# Patient Record
Sex: FEMALE | URBAN - METROPOLITAN AREA
[De-identification: names, ages, dates, MRNs, and addresses within clinical notes are randomized per-mention and may not be internally consistent; named-entity substitution may affect disease eponyms.]

---

## 2019-05-07 ENCOUNTER — APPOINTMENT (OUTPATIENT)
Age: 69
Setting detail: DERMATOLOGY
End: 2019-05-08

## 2019-05-07 DIAGNOSIS — L91.8 OTHER HYPERTROPHIC DISORDERS OF THE SKIN: ICD-10-CM

## 2019-05-07 DIAGNOSIS — L82.0 INFLAMED SEBORRHEIC KERATOSIS: ICD-10-CM

## 2019-05-07 PROBLEM — D48.5 NEOPLASM OF UNCERTAIN BEHAVIOR OF SKIN: Status: ACTIVE | Noted: 2019-05-07

## 2019-05-07 PROCEDURE — 11301 SHAVE SKIN LESION 0.6-1.0 CM: CPT

## 2019-05-07 PROCEDURE — OTHER SHAVE REMOVAL: OTHER

## 2019-05-07 PROCEDURE — OTHER REASSURANCE: OTHER

## 2019-05-07 PROCEDURE — 11301 SHAVE SKIN LESION 0.6-1.0 CM: CPT | Mod: 76

## 2019-05-07 PROCEDURE — OTHER MIPS QUALITY: OTHER

## 2019-05-07 PROCEDURE — OTHER COUNSELING: OTHER

## 2019-05-07 PROCEDURE — 99203 OFFICE O/P NEW LOW 30 MIN: CPT | Mod: 25

## 2019-05-07 PROCEDURE — OTHER PRESCRIPTION: OTHER

## 2019-05-07 PROCEDURE — OTHER DEFER: OTHER

## 2019-05-07 RX ORDER — LIDOCAINE AND PRILOCAINE 25; 25 MG/G; MG/G
CREAM TOPICAL
Qty: 1 | Refills: 0 | Status: ACTIVE

## 2019-05-07 ASSESSMENT — LOCATION SIMPLE DESCRIPTION DERM
LOCATION SIMPLE: RIGHT ANTERIOR NECK
LOCATION SIMPLE: LEFT ANTERIOR NECK
LOCATION SIMPLE: ABDOMEN
LOCATION SIMPLE: RIGHT AXILLARY VAULT

## 2019-05-07 ASSESSMENT — LOCATION DETAILED DESCRIPTION DERM
LOCATION DETAILED: RIGHT SUPERIOR ANTERIOR NECK
LOCATION DETAILED: LEFT SUPERIOR ANTERIOR NECK
LOCATION DETAILED: LEFT SUPERIOR LATERAL NECK
LOCATION DETAILED: RIGHT AXILLARY VAULT
LOCATION DETAILED: RIGHT SUPERIOR LATERAL NECK
LOCATION DETAILED: RIGHT RIB CAGE

## 2019-05-07 ASSESSMENT — LOCATION ZONE DERM
LOCATION ZONE: TRUNK
LOCATION ZONE: AXILLAE
LOCATION ZONE: NECK

## 2019-05-07 NOTE — PROCEDURE: SHAVE REMOVAL
X Size Of Lesion In Cm (Optional): 0
Add Variable For Additional Medical Justification: No
Wound Care: Petrolatum
Detail Level: Detailed
Post-Care Instructions: I reviewed with the patient in detail post-care instructions. Patient is to keep the biopsy site dry overnight, and then apply bacitracin twice daily until healed. Patient may apply hydrogen peroxide soaks to remove any crusting.
Billing Type: Third-Party Bill
Size Of Lesion In Cm (Required): 0.6
Biopsy Method: Dermablade
Anesthesia Type: 1% lidocaine with epinephrine
Hemostasis: Electrocautery
Was A Bandage Applied: Yes
Notification Instructions: Patient will be notified of biopsy results. However, patient instructed to call the office if not contacted within 2 weeks.
Consent was obtained from the patient. The risks and benefits to therapy were discussed in detail. Specifically, the risks of infection, scarring, bleeding, prolonged wound healing, incomplete removal, allergy to anesthesia, nerve injury and recurrence were addressed. Prior to the procedure, the treatment site was clearly identified and confirmed by the patient. All components of Universal Protocol/PAUSE Rule completed.
Path Notes (To The Dermatopathologist): Please check margins.
Medical Necessity Information: It is in your best interest to select a reason for this procedure from the list below. All of these items fulfill various CMS LCD requirements except the new and changing color options.

## 2019-07-25 ENCOUNTER — APPOINTMENT (OUTPATIENT)
Age: 69
Setting detail: DERMATOLOGY
End: 2019-07-25

## 2019-07-25 DIAGNOSIS — D485 NEOPLASM OF UNCERTAIN BEHAVIOR OF SKIN: ICD-10-CM

## 2019-07-25 DIAGNOSIS — L91.8 OTHER HYPERTROPHIC DISORDERS OF THE SKIN: ICD-10-CM

## 2019-07-25 PROBLEM — D48.5 NEOPLASM OF UNCERTAIN BEHAVIOR OF SKIN: Status: ACTIVE | Noted: 2019-07-25

## 2019-07-25 PROCEDURE — 11103 TANGNTL BX SKIN EA SEP/ADDL: CPT

## 2019-07-25 PROCEDURE — 17110 DESTRUCT B9 LESION 1-14: CPT

## 2019-07-25 PROCEDURE — OTHER COUNSELING: OTHER

## 2019-07-25 PROCEDURE — OTHER BENIGN DESTRUCTION: OTHER

## 2019-07-25 PROCEDURE — 11102 TANGNTL BX SKIN SINGLE LES: CPT | Mod: 59

## 2019-07-25 PROCEDURE — OTHER BIOPSY BY SHAVE METHOD: OTHER

## 2019-07-25 PROCEDURE — OTHER MIPS QUALITY: OTHER

## 2019-07-25 ASSESSMENT — LOCATION ZONE DERM
LOCATION ZONE: TRUNK
LOCATION ZONE: NECK

## 2019-07-25 ASSESSMENT — LOCATION SIMPLE DESCRIPTION DERM
LOCATION SIMPLE: RIGHT ANTERIOR NECK
LOCATION SIMPLE: LEFT CLAVICULAR SKIN
LOCATION SIMPLE: ABDOMEN

## 2019-07-25 ASSESSMENT — LOCATION DETAILED DESCRIPTION DERM
LOCATION DETAILED: RIGHT INFERIOR LATERAL NECK
LOCATION DETAILED: LEFT RIB CAGE
LOCATION DETAILED: LEFT CLAVICULAR SKIN

## 2019-07-25 NOTE — PROCEDURE: BIOPSY BY SHAVE METHOD
Electrodesiccation Text: The wound bed was treated with electrodesiccation after the biopsy was performed.
Was A Bandage Applied: Yes
Bill For Surgical Tray: no
Electrodesiccation And Curettage Text: The wound bed was treated with electrodesiccation and curettage after the biopsy was performed.
Biopsy Type: H and E
Dressing: bandage
Detail Level: Detailed
Type Of Destruction Used: Curettage
Hemostasis: Drysol
Biopsy Method: Dermablade
Curettage Text: The wound bed was treated with curettage after the biopsy was performed.
Wound Care: Petrolatum
Notification Instructions: Patient will be notified of biopsy results. However, patient instructed to call the office if not contacted within 2 weeks.
Size Of Lesion In Cm: 0
Depth Of Biopsy: dermis
Anesthesia Type: 1% lidocaine with epinephrine
Silver Nitrate Text: The wound bed was treated with silver nitrate after the biopsy was performed.
Anesthesia Volume In Cc (Will Not Render If 0): 0.5
Cryotherapy Text: The wound bed was treated with cryotherapy after the biopsy was performed.
Billing Type: Third-Party Bill
Post-Care Instructions: I reviewed with the patient in detail post-care instructions. Patient is to keep the biopsy site dry overnight, and then apply bacitracin twice daily until healed. Patient may apply hydrogen peroxide soaks to remove any crusting.
Consent: Written consent was obtained and risks were reviewed including but not limited to scarring, infection, bleeding, scabbing, incomplete removal, nerve damage and allergy to anesthesia.

## 2019-07-25 NOTE — PROCEDURE: MIPS QUALITY
Quality 130: Documentation Of Current Medications In The Medical Record: Current Medications Documented
Detail Level: Detailed
Quality 431: Preventive Care And Screening: Unhealthy Alcohol Use - Screening: Patient screened for unhealthy alcohol use using a single question and scores less than 2 times per year
Quality 110: Preventive Care And Screening: Influenza Immunization: Influenza Immunization previously received during influenza season
Quality 226: Preventive Care And Screening: Tobacco Use: Screening And Cessation Intervention: Patient screened for tobacco use and is an ex/non-smoker

## 2021-01-14 ENCOUNTER — APPOINTMENT (OUTPATIENT)
Age: 71
Setting detail: DERMATOLOGY
End: 2021-01-15

## 2021-01-14 DIAGNOSIS — L21.8 OTHER SEBORRHEIC DERMATITIS: ICD-10-CM

## 2021-01-14 DIAGNOSIS — L82.0 INFLAMED SEBORRHEIC KERATOSIS: ICD-10-CM

## 2021-01-14 PROCEDURE — OTHER COUNSELING: OTHER

## 2021-01-14 PROCEDURE — OTHER MIPS QUALITY: OTHER

## 2021-01-14 PROCEDURE — OTHER LIQUID NITROGEN: OTHER

## 2021-01-14 PROCEDURE — 17110 DESTRUCT B9 LESION 1-14: CPT

## 2021-01-14 PROCEDURE — OTHER PRESCRIPTION: OTHER

## 2021-01-14 PROCEDURE — 99213 OFFICE O/P EST LOW 20 MIN: CPT | Mod: 25

## 2021-01-14 RX ORDER — TRIAMCINOLONE ACETONIDE 1 MG/G
CREAM TOPICAL
Qty: 1 | Refills: 2 | Status: ERX | COMMUNITY
Start: 2021-01-14

## 2021-01-14 ASSESSMENT — LOCATION SIMPLE DESCRIPTION DERM
LOCATION SIMPLE: LEFT UPPER BACK
LOCATION SIMPLE: RIGHT UPPER BACK

## 2021-01-14 ASSESSMENT — LOCATION DETAILED DESCRIPTION DERM
LOCATION DETAILED: RIGHT SUPERIOR UPPER BACK
LOCATION DETAILED: RIGHT MID-UPPER BACK
LOCATION DETAILED: LEFT MID-UPPER BACK

## 2021-01-14 ASSESSMENT — LOCATION ZONE DERM: LOCATION ZONE: TRUNK

## 2021-01-14 NOTE — PROCEDURE: LIQUID NITROGEN
Render Post-Care Instructions In Note?: no
Include Z78.9 (Other Specified Conditions Influencing Health Status) As An Associated Diagnosis?: Yes
Post-Care Instructions: I reviewed with the patient in detail post-care instructions. Patient is to wear sunprotection, and avoid picking at any of the treated lesions. Pt may apply Vaseline to crusted or scabbing areas.
Medical Necessity Information: It is in your best interest to select a reason for this procedure from the list below. All of these items fulfill various CMS LCD requirements except the new and changing color options.
Medical Necessity Clause: This procedure was medically necessary because the lesions that were treated were:
Duration Of Freeze Thaw-Cycle (Seconds): 5-10
Number Of Freeze-Thaw Cycles: 2 freeze-thaw cycles
Consent: The patient's consent was obtained including but not limited to risks of crusting, scabbing, blistering, scarring, darker or lighter pigmentary change, recurrence, incomplete removal and infection.
Detail Level: Detailed

## 2021-11-16 ENCOUNTER — OFFICE VISIT (OUTPATIENT)
Dept: URBAN - METROPOLITAN AREA TELEHEALTH 2 | Facility: TELEHEALTH | Age: 71
End: 2021-11-16

## 2021-11-17 ENCOUNTER — OFFICE VISIT (OUTPATIENT)
Dept: URBAN - NONMETROPOLITAN AREA CLINIC 4 | Facility: CLINIC | Age: 71
End: 2021-11-17

## 2021-11-30 ENCOUNTER — OFFICE VISIT (OUTPATIENT)
Dept: URBAN - METROPOLITAN AREA TELEHEALTH 2 | Facility: TELEHEALTH | Age: 71
End: 2021-11-30
Payer: MEDICARE

## 2021-11-30 DIAGNOSIS — Z86.010 PERSONAL HISTORY OF COLONIC POLYPS: ICD-10-CM

## 2021-11-30 PROCEDURE — 99202 OFFICE O/P NEW SF 15 MIN: CPT | Performed by: INTERNAL MEDICINE

## 2021-11-30 RX ORDER — ATORVASTATIN CALCIUM 80 MG/1
TAKE 1 TABLET (80 MG) BY ORAL ROUTE ONCE DAILY TABLET, FILM COATED ORAL 1
Qty: 0 | Refills: 0 | Status: ACTIVE | COMMUNITY
Start: 1900-01-01

## 2021-11-30 RX ORDER — OMEGA-3/DHA/EPA/FISH OIL 60 MG-90MG
CAPSULE ORAL
Qty: 0 | Refills: 0 | Status: ACTIVE | COMMUNITY
Start: 1900-01-01

## 2021-11-30 RX ORDER — ESTRADIOL 0.1 MG/G
CREAM VAGINAL
Qty: 0 | Refills: 0 | Status: ACTIVE | COMMUNITY
Start: 1900-01-01

## 2021-11-30 RX ORDER — ASPIRIN 81 MG/1
TABLET, COATED ORAL
Qty: 0 | Refills: 0 | Status: ACTIVE | COMMUNITY
Start: 1900-01-01

## 2021-11-30 RX ORDER — LISINOPRIL 5 MG/1
TAKE 1 TABLET (5 MG) BY ORAL ROUTE ONCE DAILY TABLET ORAL 1
Qty: 0 | Refills: 0 | Status: ACTIVE | COMMUNITY
Start: 1900-01-01

## 2021-11-30 RX ORDER — EZETIMIBE 10 MG/1
TAKE 1 TABLET (10 MG) BY ORAL ROUTE ONCE DAILY TABLET ORAL 1
Qty: 0 | Refills: 0 | Status: ACTIVE | COMMUNITY
Start: 1900-01-01

## 2021-11-30 RX ORDER — SODIUM, POTASSIUM,MAG SULFATES 17.5-3.13G
354 ML SOLUTION, RECONSTITUTED, ORAL ORAL ONCE
Qty: 1 BOX | Refills: 0 | OUTPATIENT
Start: 2021-11-30 | End: 2021-12-01

## 2021-11-30 RX ORDER — FENOFIBRATE 160 MG/1
TAKE 1 TABLET (160 MG) BY ORAL ROUTE ONCE DAILY TABLET ORAL 1
Qty: 0 | Refills: 0 | Status: ACTIVE | COMMUNITY
Start: 1900-01-01

## 2021-11-30 NOTE — HPI-TODAY'S VISIT:
Patient with personal history of colon polyps. Occ constipation and hemorrhoids.  Pt with hx of HTN and dyslipidemia. Pt reports that she has had intermittent pain in rt groin, heavy lifting exacerbates.  Pt reports that she has been to GYN and there was no explanation for this.

## 2021-12-07 ENCOUNTER — TELEPHONE ENCOUNTER (OUTPATIENT)
Dept: URBAN - METROPOLITAN AREA CLINIC 92 | Facility: CLINIC | Age: 71
End: 2021-12-07

## 2022-02-23 ENCOUNTER — CLAIMS CREATED FROM THE CLAIM WINDOW (OUTPATIENT)
Dept: URBAN - METROPOLITAN AREA CLINIC 4 | Facility: CLINIC | Age: 72
End: 2022-02-23
Payer: MEDICARE

## 2022-02-23 ENCOUNTER — OFFICE VISIT (OUTPATIENT)
Dept: URBAN - METROPOLITAN AREA SURGERY CENTER 14 | Facility: SURGERY CENTER | Age: 72
End: 2022-02-23
Payer: MEDICARE

## 2022-02-23 DIAGNOSIS — D12.5 BENIGN NEOPLASM OF SIGMOID COLON: ICD-10-CM

## 2022-02-23 DIAGNOSIS — Z12.11 AVERAGE RISK FOR CRC. DUE TO PT'S CO-MORBID STATE WITH END STAGE DEMENTIA, HIGH RISK FOR ANESTHESIA (PER NEUROLOGY); INABILITY TO TAKE A BOWEL PREP....WOULD NOT ADVISE ANY COLORECTAL CANCER SCREENING INCLUDING STOOL TEST FOR FECAL BLOOD.: ICD-10-CM

## 2022-02-23 DIAGNOSIS — D12.5 ADENOMA OF SIGMOID COLON: ICD-10-CM

## 2022-02-23 PROCEDURE — G8907 PT DOC NO EVENTS ON DISCHARG: HCPCS | Performed by: INTERNAL MEDICINE

## 2022-02-23 PROCEDURE — 88305 TISSUE EXAM BY PATHOLOGIST: CPT | Performed by: PATHOLOGY

## 2022-02-23 PROCEDURE — 45385 COLONOSCOPY W/LESION REMOVAL: CPT | Performed by: INTERNAL MEDICINE

## 2022-03-08 ENCOUNTER — WEB ENCOUNTER (OUTPATIENT)
Dept: URBAN - NONMETROPOLITAN AREA CLINIC 4 | Facility: CLINIC | Age: 72
End: 2022-03-08

## 2022-03-09 ENCOUNTER — OFFICE VISIT (OUTPATIENT)
Dept: URBAN - NONMETROPOLITAN AREA CLINIC 4 | Facility: CLINIC | Age: 72
End: 2022-03-09
Payer: MEDICARE

## 2022-03-09 VITALS
WEIGHT: 185.2 LBS | TEMPERATURE: 97.5 F | HEIGHT: 65 IN | HEART RATE: 81 BPM | DIASTOLIC BLOOD PRESSURE: 76 MMHG | BODY MASS INDEX: 30.86 KG/M2 | SYSTOLIC BLOOD PRESSURE: 130 MMHG

## 2022-03-09 DIAGNOSIS — R10.31 RIGHT GROIN PAIN: ICD-10-CM

## 2022-03-09 DIAGNOSIS — Z86.010 PERSONAL HISTORY OF COLONIC POLYPS: ICD-10-CM

## 2022-03-09 DIAGNOSIS — K57.30 SIGMOID DIVERTICULOSIS: ICD-10-CM

## 2022-03-09 DIAGNOSIS — D12.5 ADENOMATOUS POLYP OF SIGMOID COLON: ICD-10-CM

## 2022-03-09 PROBLEM — 427910000: Status: ACTIVE | Noted: 2022-03-09

## 2022-03-09 PROCEDURE — 99213 OFFICE O/P EST LOW 20 MIN: CPT | Performed by: REGISTERED NURSE

## 2022-03-09 RX ORDER — ATORVASTATIN CALCIUM 80 MG/1
TAKE 1 TABLET (80 MG) BY ORAL ROUTE ONCE DAILY TABLET, FILM COATED ORAL 1
Qty: 0 | Refills: 0 | Status: ACTIVE | COMMUNITY

## 2022-03-09 RX ORDER — OMEGA-3/DHA/EPA/FISH OIL 60 MG-90MG
CAPSULE ORAL
Qty: 0 | Refills: 0 | Status: ACTIVE | COMMUNITY

## 2022-03-09 RX ORDER — ESTRADIOL 0.1 MG/G
CREAM VAGINAL
Qty: 0 | Refills: 0 | Status: DISCONTINUED | COMMUNITY

## 2022-03-09 RX ORDER — ASPIRIN 81 MG/1
TABLET, COATED ORAL
Qty: 0 | Refills: 0 | Status: ACTIVE | COMMUNITY

## 2022-03-09 RX ORDER — FENOFIBRATE 160 MG/1
TAKE 1 TABLET (160 MG) BY ORAL ROUTE ONCE DAILY TABLET ORAL 1
Qty: 0 | Refills: 0 | Status: DISCONTINUED | COMMUNITY

## 2022-03-09 RX ORDER — EZETIMIBE 10 MG/1
TAKE 1 TABLET (10 MG) BY ORAL ROUTE ONCE DAILY TABLET ORAL 1
Qty: 0 | Refills: 0 | Status: DISCONTINUED | COMMUNITY

## 2022-03-09 RX ORDER — LISINOPRIL 5 MG/1
TAKE 1 TABLET (5 MG) BY ORAL ROUTE ONCE DAILY TABLET ORAL 1
Qty: 0 | Refills: 0 | Status: ACTIVE | COMMUNITY

## 2022-03-09 NOTE — HPI-TODAY'S VISIT:
Patient with personal history of colon polyps. Occ constipation and hemorrhoids.  Pt with hx of HTN and dyslipidemia. Pt reports that she has had intermittent pain in rt groin, heavy lifting exacerbates.  Pt reports that she has been to GYN and there was no explanation for this.  3/9/22: Pt RTC for f/u. Had cscope 2/23/22: - Non-bleeding internal hemorrhoids. - One 3 mm polyp in the sigmoid colon, removed with a cold snare. Resected and retrieved. - Diverticulosis in the sigmoid colon. - Medium-sized lipoma in the ascending colon. - The examination was otherwise normal. Bx c/w adenomatous polyp. Rpt in 7 yrs. Denies any new GI complaints. Has chronic RLQ groin pain.

## 2022-05-11 ENCOUNTER — WEB ENCOUNTER (OUTPATIENT)
Dept: URBAN - NONMETROPOLITAN AREA CLINIC 4 | Facility: CLINIC | Age: 72
End: 2022-05-11

## 2023-01-10 ENCOUNTER — OFFICE VISIT (OUTPATIENT)
Dept: URBAN - NONMETROPOLITAN AREA CLINIC 4 | Facility: CLINIC | Age: 73
End: 2023-01-10

## 2023-02-11 ENCOUNTER — WEB ENCOUNTER (OUTPATIENT)
Dept: URBAN - NONMETROPOLITAN AREA CLINIC 4 | Facility: CLINIC | Age: 73
End: 2023-02-11

## 2023-02-13 ENCOUNTER — OFFICE VISIT (OUTPATIENT)
Dept: URBAN - NONMETROPOLITAN AREA CLINIC 4 | Facility: CLINIC | Age: 73
End: 2023-02-13
Payer: MEDICARE

## 2023-02-13 ENCOUNTER — LAB OUTSIDE AN ENCOUNTER (OUTPATIENT)
Dept: URBAN - NONMETROPOLITAN AREA CLINIC 4 | Facility: CLINIC | Age: 73
End: 2023-02-13

## 2023-02-13 VITALS
SYSTOLIC BLOOD PRESSURE: 158 MMHG | WEIGHT: 180.2 LBS | HEIGHT: 65 IN | BODY MASS INDEX: 30.02 KG/M2 | TEMPERATURE: 97.7 F | DIASTOLIC BLOOD PRESSURE: 74 MMHG | HEART RATE: 76 BPM

## 2023-02-13 DIAGNOSIS — K57.30 SIGMOID DIVERTICULOSIS: ICD-10-CM

## 2023-02-13 DIAGNOSIS — D12.5 ADENOMATOUS POLYP OF SIGMOID COLON: ICD-10-CM

## 2023-02-13 DIAGNOSIS — R13.19 CERVICAL DYSPHAGIA: ICD-10-CM

## 2023-02-13 DIAGNOSIS — R10.31 RIGHT GROIN PAIN: ICD-10-CM

## 2023-02-13 PROBLEM — 428283002: Status: ACTIVE | Noted: 2021-11-30

## 2023-02-13 PROBLEM — 429430001: Status: ACTIVE | Noted: 2022-03-09

## 2023-02-13 PROCEDURE — 99214 OFFICE O/P EST MOD 30 MIN: CPT | Performed by: REGISTERED NURSE

## 2023-02-13 RX ORDER — CHOLECALCIFEROL (VITAMIN D3) 50 MCG
1 TABLET TABLET ORAL ONCE A DAY
Status: ACTIVE | COMMUNITY

## 2023-02-13 RX ORDER — ATORVASTATIN CALCIUM 80 MG/1
TAKE 1 TABLET (80 MG) BY ORAL ROUTE ONCE DAILY TABLET, FILM COATED ORAL 1
Qty: 0 | Refills: 0 | Status: ACTIVE | COMMUNITY

## 2023-02-13 RX ORDER — OMEPRAZOLE 40 MG/1
1 CAPSULE 30 MINUTES BEFORE MORNING MEAL CAPSULE, DELAYED RELEASE ORAL ONCE A DAY
Status: ACTIVE | COMMUNITY

## 2023-02-13 RX ORDER — ASPIRIN 81 MG/1
TABLET, COATED ORAL
Qty: 0 | Refills: 0 | Status: ACTIVE | COMMUNITY

## 2023-02-13 RX ORDER — OMEGA-3/DHA/EPA/FISH OIL 60 MG-90MG
CAPSULE ORAL
Qty: 0 | Refills: 0 | Status: DISCONTINUED | COMMUNITY

## 2023-02-13 RX ORDER — LISINOPRIL 5 MG/1
TAKE 1 TABLET (5 MG) BY ORAL ROUTE ONCE DAILY TABLET ORAL 1
Qty: 0 | Refills: 0 | Status: ACTIVE | COMMUNITY

## 2023-02-13 NOTE — HPI-TODAY'S VISIT:
Patient with personal history of colon polyps. Occ constipation and hemorrhoids.  Pt with hx of HTN and dyslipidemia. Pt reports that she has had intermittent pain in rt groin, heavy lifting exacerbates.  Pt reports that she has been to GYN and there was no explanation for this.  3/9/22: Pt RTC for f/u. Had cscope 2/23/22: - Non-bleeding internal hemorrhoids. - One 3 mm polyp in the sigmoid colon, removed with a cold snare. Resected and retrieved. - Diverticulosis in the sigmoid colon. - Medium-sized lipoma in the ascending colon. - The examination was otherwise normal. Bx c/w adenomatous polyp. Rpt in 7 yrs. Denies any new GI complaints. Has chronic RLQ groin pain.  2/13/23: Pt was referred by Dr. Feliberto Covarrubias for evaluation of dysphagia. Pt reports difficulty swallowing pills and certain foods such as bread. She reports being more stress due to Mother passing away in October 2022. Her omeprazole was increased from 20 mg to 40 mg daily. Last EGD in 2013 revealed hiatal hernia, fundic gland polyps, gastritis. She continues to c/o RLQ pain.

## 2023-03-28 ENCOUNTER — OFFICE VISIT (OUTPATIENT)
Dept: URBAN - METROPOLITAN AREA SURGERY CENTER 14 | Facility: SURGERY CENTER | Age: 73
End: 2023-03-28

## 2023-04-13 ENCOUNTER — WEB ENCOUNTER (OUTPATIENT)
Dept: URBAN - METROPOLITAN AREA SURGERY CENTER 14 | Facility: SURGERY CENTER | Age: 73
End: 2023-04-13

## 2023-04-17 ENCOUNTER — CLAIMS CREATED FROM THE CLAIM WINDOW (OUTPATIENT)
Dept: URBAN - METROPOLITAN AREA CLINIC 4 | Facility: CLINIC | Age: 73
End: 2023-04-17
Payer: MEDICARE

## 2023-04-17 ENCOUNTER — OFFICE VISIT (OUTPATIENT)
Dept: URBAN - METROPOLITAN AREA SURGERY CENTER 14 | Facility: SURGERY CENTER | Age: 73
End: 2023-04-17
Payer: MEDICARE

## 2023-04-17 DIAGNOSIS — K31.89 OTHER DISEASES OF STOMACH AND DUODENUM: ICD-10-CM

## 2023-04-17 DIAGNOSIS — K22.70 BARRETT ESOPHAGUS: ICD-10-CM

## 2023-04-17 DIAGNOSIS — K31.7 BENIGN GASTRIC POLYP: ICD-10-CM

## 2023-04-17 DIAGNOSIS — K22.719 BARRETT'S ESOPHAGUS WITH DYSPLASIA, UNSPECIFIED: ICD-10-CM

## 2023-04-17 DIAGNOSIS — R13.19 CERVICAL DYSPHAGIA: ICD-10-CM

## 2023-04-17 DIAGNOSIS — K29.70 GASTRITIS, UNSPECIFIED, WITHOUT BLEEDING: ICD-10-CM

## 2023-04-17 DIAGNOSIS — K22.2 ACQUIRED ESOPHAGEAL RING: ICD-10-CM

## 2023-04-17 DIAGNOSIS — K31.7 POLYP OF STOMACH AND DUODENUM: ICD-10-CM

## 2023-04-17 DIAGNOSIS — K31.89 ACQUIRED DEFORMITY OF DUODENUM: ICD-10-CM

## 2023-04-17 PROCEDURE — 88312 SPECIAL STAINS GROUP 1: CPT | Performed by: PATHOLOGY

## 2023-04-17 PROCEDURE — G8907 PT DOC NO EVENTS ON DISCHARG: HCPCS | Performed by: INTERNAL MEDICINE

## 2023-04-17 PROCEDURE — 88313 SPECIAL STAINS GROUP 2: CPT | Performed by: PATHOLOGY

## 2023-04-17 PROCEDURE — 43239 EGD BIOPSY SINGLE/MULTIPLE: CPT | Performed by: INTERNAL MEDICINE

## 2023-04-17 PROCEDURE — 43450 DILATE ESOPHAGUS 1/MULT PASS: CPT | Performed by: INTERNAL MEDICINE

## 2023-04-17 PROCEDURE — 43251 EGD REMOVE LESION SNARE: CPT | Performed by: INTERNAL MEDICINE

## 2023-04-17 PROCEDURE — 88305 TISSUE EXAM BY PATHOLOGIST: CPT | Performed by: PATHOLOGY

## 2023-05-29 ENCOUNTER — WEB ENCOUNTER (OUTPATIENT)
Dept: URBAN - NONMETROPOLITAN AREA CLINIC 4 | Facility: CLINIC | Age: 73
End: 2023-05-29

## 2023-06-01 ENCOUNTER — OFFICE VISIT (OUTPATIENT)
Dept: URBAN - NONMETROPOLITAN AREA CLINIC 4 | Facility: CLINIC | Age: 73
End: 2023-06-01
Payer: MEDICARE

## 2023-06-01 ENCOUNTER — DASHBOARD ENCOUNTERS (OUTPATIENT)
Age: 73
End: 2023-06-01

## 2023-06-01 VITALS
SYSTOLIC BLOOD PRESSURE: 107 MMHG | BODY MASS INDEX: 29.59 KG/M2 | DIASTOLIC BLOOD PRESSURE: 65 MMHG | WEIGHT: 177.6 LBS | HEART RATE: 86 BPM | TEMPERATURE: 97.2 F | HEIGHT: 65 IN

## 2023-06-01 DIAGNOSIS — R13.10 DYSPHAGIA, UNSPECIFIED TYPE: ICD-10-CM

## 2023-06-01 DIAGNOSIS — R07.89 CHEST DISCOMFORT: ICD-10-CM

## 2023-06-01 DIAGNOSIS — K21.9 GASTROESOPHAGEAL REFLUX DISEASE, UNSPECIFIED WHETHER ESOPHAGITIS PRESENT: ICD-10-CM

## 2023-06-01 DIAGNOSIS — Z86.010 PERSONAL HISTORY OF COLONIC POLYPS: ICD-10-CM

## 2023-06-01 PROBLEM — 302914006: Status: ACTIVE | Noted: 2023-06-01

## 2023-06-01 PROBLEM — 235595009: Status: ACTIVE | Noted: 2023-02-13

## 2023-06-01 PROBLEM — 40739000: Status: ACTIVE | Noted: 2023-02-13

## 2023-06-01 PROCEDURE — 99213 OFFICE O/P EST LOW 20 MIN: CPT | Performed by: REGISTERED NURSE

## 2023-06-01 RX ORDER — ATORVASTATIN CALCIUM 80 MG/1
TAKE 1 TABLET (80 MG) BY ORAL ROUTE ONCE DAILY TABLET, FILM COATED ORAL 1
Qty: 0 | Refills: 0 | Status: ACTIVE | COMMUNITY

## 2023-06-01 RX ORDER — LISINOPRIL 20 MG/1
1 TABLET TABLET ORAL ONCE A DAY
Refills: 0 | Status: ACTIVE | COMMUNITY

## 2023-06-01 RX ORDER — OMEPRAZOLE 40 MG/1
1 CAPSULE 30 MINUTES BEFORE MORNING MEAL CAPSULE, DELAYED RELEASE ORAL ONCE A DAY
Status: ACTIVE | COMMUNITY

## 2023-06-01 RX ORDER — MULTIVIT-MIN/IRON/FOLIC ACID/K 18-600-40
1 CAPSULE CAPSULE ORAL ONCE A DAY
Status: ACTIVE | COMMUNITY

## 2023-06-01 RX ORDER — ASPIRIN 81 MG/1
TABLET, COATED ORAL
Qty: 0 | Refills: 0 | Status: ACTIVE | COMMUNITY

## 2023-06-01 NOTE — HPI-TODAY'S VISIT:
Patient with personal history of colon polyps. Occ constipation and hemorrhoids.  Pt with hx of HTN and dyslipidemia. Pt reports that she has had intermittent pain in rt groin, heavy lifting exacerbates.  Pt reports that she has been to GYN and there was no explanation for this.  3/9/22: Pt RTC for f/u. Had cscope 2/23/22: - Non-bleeding internal hemorrhoids. - One 3 mm polyp in the sigmoid colon, removed with a cold snare. Resected and retrieved. - Diverticulosis in the sigmoid colon. - Medium-sized lipoma in the ascending colon. - The examination was otherwise normal. Bx c/w adenomatous polyp. Rpt in 7 yrs. Denies any new GI complaints. Has chronic RLQ groin pain.  2/13/23: Pt was referred by Dr. Feliberto Covarrubias for evaluation of dysphagia. Pt reports difficulty swallowing pills and certain foods such as bread. She reports being more stress due to Mother passing away in October 2022. Her omeprazole was increased from 20 mg to 40 mg daily. Last EGD in 2013 revealed hiatal hernia, fundic gland polyps, gastritis. She continues to c/o RLQ pain.  6/1/23: Pt RTC for f/u. Had EGD 4/17/23: - Normal examined duodenum. - Multiple gastric polyps. - 3 cm hiatal hernia. - Z-line regular, 35 cm from the incisors. - Moderate Schatzki ring. Dilated. - Biopsies were taken with a cold forceps for evaluation of eosinophilic esophagitis. - Biopsies were taken with a cold forceps for Helicobacter pylori testing. Bx c/w Covarrubias's esophagus and fundic gland polyp  She reports improvement in swallowing since EGD. Reports occasional chest aching/discomfort with swallowing. Reflux well controlled on omeprazole 40 mg daily. No other GI complaints.

## 2024-06-18 ENCOUNTER — LAB OUTSIDE AN ENCOUNTER (OUTPATIENT)
Dept: URBAN - NONMETROPOLITAN AREA CLINIC 4 | Facility: CLINIC | Age: 74
End: 2024-06-18

## 2024-06-18 ENCOUNTER — OFFICE VISIT (OUTPATIENT)
Dept: URBAN - NONMETROPOLITAN AREA CLINIC 4 | Facility: CLINIC | Age: 74
End: 2024-06-18
Payer: MEDICARE

## 2024-06-18 VITALS
HEART RATE: 82 BPM | BODY MASS INDEX: 30.66 KG/M2 | WEIGHT: 184 LBS | DIASTOLIC BLOOD PRESSURE: 77 MMHG | SYSTOLIC BLOOD PRESSURE: 122 MMHG | TEMPERATURE: 98.2 F | HEIGHT: 65 IN

## 2024-06-18 DIAGNOSIS — K59.09 CHANGE IN BOWEL MOVEMENTS INTERMITTENT CONSTIPATION. URGENCY IN THE MORNING.: ICD-10-CM

## 2024-06-18 DIAGNOSIS — K22.70 BARRETT'S ESOPHAGUS WITHOUT DYSPLASIA: ICD-10-CM

## 2024-06-18 DIAGNOSIS — K57.30 SIGMOID DIVERTICULOSIS: ICD-10-CM

## 2024-06-18 DIAGNOSIS — K21.9 GASTROESOPHAGEAL REFLUX DISEASE, UNSPECIFIED WHETHER ESOPHAGITIS PRESENT: ICD-10-CM

## 2024-06-18 PROBLEM — 235623002: Status: ACTIVE | Noted: 2024-06-18

## 2024-06-18 PROBLEM — 14760008: Status: ACTIVE | Noted: 2024-06-18

## 2024-06-18 PROCEDURE — 99214 OFFICE O/P EST MOD 30 MIN: CPT | Performed by: REGISTERED NURSE

## 2024-06-18 RX ORDER — OMEPRAZOLE 40 MG/1
1 CAPSULE 30 MINUTES BEFORE MORNING MEAL CAPSULE, DELAYED RELEASE ORAL ONCE A DAY
Status: ACTIVE | COMMUNITY

## 2024-06-18 RX ORDER — MULTIVIT-MIN/IRON/FOLIC ACID/K 18-600-40
1 CAPSULE CAPSULE ORAL ONCE A DAY
Status: ACTIVE | COMMUNITY

## 2024-06-18 RX ORDER — LISINOPRIL 20 MG/1
1 TABLET TABLET ORAL ONCE A DAY
Refills: 0 | Status: ACTIVE | COMMUNITY

## 2024-06-18 RX ORDER — OMEPRAZOLE 20 MG/1
1 CAPSULE 30 MINUTES BEFORE MORNING MEAL CAPSULE, DELAYED RELEASE ORAL TWICE A DAY
Qty: 60 | Refills: 1 | OUTPATIENT
Start: 2024-06-18

## 2024-06-18 RX ORDER — ASPIRIN 81 MG/1
TABLET, COATED ORAL
Qty: 0 | Refills: 0 | Status: ACTIVE | COMMUNITY

## 2024-06-18 RX ORDER — OMEPRAZOLE 20 MG/1
1 CAPSULE 30 MINUTES BEFORE A MEAL CAPSULE, DELAYED RELEASE ORAL TWICE A DAY
Qty: 60 | Refills: 1 | OUTPATIENT
Start: 2024-06-18

## 2024-06-18 RX ORDER — ATORVASTATIN CALCIUM 80 MG/1
TAKE 1 TABLET (80 MG) BY ORAL ROUTE ONCE DAILY TABLET, FILM COATED ORAL 1
Qty: 0 | Refills: 0 | Status: ACTIVE | COMMUNITY

## 2024-06-18 NOTE — HPI-TODAY'S VISIT:
Patient with personal history of colon polyps. Occ constipation and hemorrhoids.  Pt with hx of HTN and dyslipidemia. Pt reports that she has had intermittent pain in rt groin, heavy lifting exacerbates.  Pt reports that she has been to GYN and there was no explanation for this.  3/9/22: Pt RTC for f/u. Had cscope 2/23/22: - Non-bleeding internal hemorrhoids. - One 3 mm polyp in the sigmoid colon, removed with a cold snare. Resected and retrieved. - Diverticulosis in the sigmoid colon. - Medium-sized lipoma in the ascending colon. - The examination was otherwise normal. Bx c/w adenomatous polyp. Rpt in 7 yrs. Denies any new GI complaints. Has chronic RLQ groin pain.  2/13/23: Pt was referred by Dr. Feliberto Covarrubias for evaluation of dysphagia. Pt reports difficulty swallowing pills and certain foods such as bread. She reports being more stress due to Mother passing away in October 2022. Her omeprazole was increased from 20 mg to 40 mg daily. Last EGD in 2013 revealed hiatal hernia, fundic gland polyps, gastritis. She continues to c/o RLQ pain.  6/1/23: Pt RTC for f/u. Had EGD 4/17/23: - Normal examined duodenum. - Multiple gastric polyps. - 3 cm hiatal hernia. - Z-line regular, 35 cm from the incisors. - Moderate Schatzki ring. Dilated. - Biopsies were taken with a cold forceps for evaluation of eosinophilic esophagitis. - Biopsies were taken with a cold forceps for Helicobacter pylori testing. Bx c/w Covarrubias's esophagus and fundic gland polyp  She reports improvement in swallowing since EGD. Reports occasional chest aching/discomfort with swallowing. Reflux well controlled on omeprazole 40 mg daily. No other GI complaints.  6/18/24: Pt RTC with c/o dysphagia, globus sensation, hoarse voice and throat clearing as well as constipation, bloating and abdominal pain. She remains on omeprazole 20 mg daily. Typically has a BM daily. Stools are hard and she admits to straining. She has incomplete evacuation. Denies hematochezia, fever or chills.

## 2024-08-02 ENCOUNTER — OFFICE VISIT (OUTPATIENT)
Dept: URBAN - METROPOLITAN AREA SURGERY CENTER 14 | Facility: SURGERY CENTER | Age: 74
End: 2024-08-02

## 2024-08-20 ENCOUNTER — OFFICE VISIT (OUTPATIENT)
Dept: URBAN - NONMETROPOLITAN AREA CLINIC 4 | Facility: CLINIC | Age: 74
End: 2024-08-20

## 2024-08-26 ENCOUNTER — OFFICE VISIT (OUTPATIENT)
Dept: URBAN - METROPOLITAN AREA SURGERY CENTER 14 | Facility: SURGERY CENTER | Age: 74
End: 2024-08-26

## 2024-08-26 ENCOUNTER — CLAIMS CREATED FROM THE CLAIM WINDOW (OUTPATIENT)
Dept: URBAN - METROPOLITAN AREA CLINIC 4 | Facility: CLINIC | Age: 74
End: 2024-08-26
Payer: MEDICARE

## 2024-08-26 DIAGNOSIS — K21.9 GASTRO-ESOPHAGEAL REFLUX DISEASE WITHOUT ESOPHAGITIS: ICD-10-CM

## 2024-08-26 DIAGNOSIS — K31.89 OTHER DISEASES OF STOMACH AND DUODENUM: ICD-10-CM

## 2024-08-26 DIAGNOSIS — K31.7 POLYP OF STOMACH AND DUODENUM: ICD-10-CM

## 2024-08-26 PROCEDURE — 88312 SPECIAL STAINS GROUP 1: CPT | Performed by: PATHOLOGY

## 2024-08-26 PROCEDURE — 88305 TISSUE EXAM BY PATHOLOGIST: CPT | Performed by: PATHOLOGY

## 2024-08-26 RX ORDER — ATORVASTATIN CALCIUM 80 MG/1
TAKE 1 TABLET (80 MG) BY ORAL ROUTE ONCE DAILY TABLET, FILM COATED ORAL 1
Qty: 0 | Refills: 0 | Status: ACTIVE | COMMUNITY

## 2024-08-26 RX ORDER — ASPIRIN 81 MG/1
TABLET, COATED ORAL
Qty: 0 | Refills: 0 | Status: ACTIVE | COMMUNITY

## 2024-08-26 RX ORDER — MULTIVIT-MIN/IRON/FOLIC ACID/K 18-600-40
1 CAPSULE CAPSULE ORAL ONCE A DAY
Status: ACTIVE | COMMUNITY

## 2024-08-26 RX ORDER — OMEPRAZOLE 40 MG/1
1 CAPSULE 30 MINUTES BEFORE MORNING MEAL CAPSULE, DELAYED RELEASE ORAL ONCE A DAY
Status: ACTIVE | COMMUNITY

## 2024-08-26 RX ORDER — OMEPRAZOLE 20 MG/1
1 CAPSULE 30 MINUTES BEFORE A MEAL CAPSULE, DELAYED RELEASE ORAL TWICE A DAY
Qty: 60 | Refills: 1 | Status: ACTIVE | COMMUNITY
Start: 2024-06-18

## 2024-08-26 RX ORDER — LISINOPRIL 20 MG/1
1 TABLET TABLET ORAL ONCE A DAY
Refills: 0 | Status: ACTIVE | COMMUNITY

## 2024-08-26 RX ORDER — OMEPRAZOLE 20 MG/1
1 CAPSULE 30 MINUTES BEFORE MORNING MEAL CAPSULE, DELAYED RELEASE ORAL TWICE A DAY
Qty: 60 | Refills: 1 | Status: ACTIVE | COMMUNITY
Start: 2024-06-18

## 2024-09-09 ENCOUNTER — OFFICE VISIT (OUTPATIENT)
Dept: URBAN - NONMETROPOLITAN AREA CLINIC 4 | Facility: CLINIC | Age: 74
End: 2024-09-09
Payer: MEDICARE

## 2024-09-09 VITALS
BODY MASS INDEX: 30.66 KG/M2 | HEIGHT: 65 IN | DIASTOLIC BLOOD PRESSURE: 106 MMHG | HEART RATE: 107 BPM | WEIGHT: 184 LBS | TEMPERATURE: 98 F | SYSTOLIC BLOOD PRESSURE: 166 MMHG

## 2024-09-09 DIAGNOSIS — K21.9 GASTROESOPHAGEAL REFLUX DISEASE, UNSPECIFIED WHETHER ESOPHAGITIS PRESENT: ICD-10-CM

## 2024-09-09 DIAGNOSIS — K57.30 SIGMOID DIVERTICULOSIS: ICD-10-CM

## 2024-09-09 DIAGNOSIS — D13.1 BENIGN FUNDIC GLAND POLYPS OF STOMACH: ICD-10-CM

## 2024-09-09 DIAGNOSIS — D12.5 ADENOMATOUS POLYP OF SIGMOID COLON: ICD-10-CM

## 2024-09-09 PROCEDURE — 99213 OFFICE O/P EST LOW 20 MIN: CPT | Performed by: REGISTERED NURSE

## 2024-09-09 RX ORDER — MULTIVIT-MIN/IRON/FOLIC ACID/K 18-600-40
1 CAPSULE CAPSULE ORAL ONCE A DAY
COMMUNITY

## 2024-09-09 RX ORDER — OMEPRAZOLE 20 MG/1
1 CAPSULE 30 MINUTES BEFORE A MEAL CAPSULE, DELAYED RELEASE ORAL TWICE A DAY
Qty: 60 | Refills: 1 | Status: DISCONTINUED | COMMUNITY
Start: 2024-06-18

## 2024-09-09 RX ORDER — OMEPRAZOLE 40 MG/1
1 CAPSULE 30 MINUTES BEFORE MORNING MEAL CAPSULE, DELAYED RELEASE ORAL ONCE A DAY
COMMUNITY

## 2024-09-09 RX ORDER — LISINOPRIL 20 MG/1
1 TABLET TABLET ORAL ONCE A DAY
Refills: 0 | COMMUNITY

## 2024-09-09 RX ORDER — ASPIRIN 81 MG/1
TABLET, COATED ORAL
Qty: 0 | Refills: 0 | COMMUNITY

## 2024-09-09 RX ORDER — OMEPRAZOLE 20 MG/1
1 CAPSULE 30 MINUTES BEFORE MORNING MEAL CAPSULE, DELAYED RELEASE ORAL TWICE A DAY
Qty: 60 | Refills: 1 | Status: DISCONTINUED | COMMUNITY
Start: 2024-06-18

## 2024-09-09 RX ORDER — PANTOPRAZOLE SODIUM 40 MG/1
1 TABLET TABLET, DELAYED RELEASE ORAL ONCE A DAY
Qty: 30 | Refills: 3 | OUTPATIENT
Start: 2024-09-09

## 2024-09-09 RX ORDER — ATORVASTATIN CALCIUM 80 MG/1
TAKE 1 TABLET (80 MG) BY ORAL ROUTE ONCE DAILY TABLET, FILM COATED ORAL 1
Qty: 0 | Refills: 0 | COMMUNITY

## 2024-09-09 NOTE — HPI-TODAY'S VISIT:
Patient with personal history of colon polyps. Occ constipation and hemorrhoids.  Pt with hx of HTN and dyslipidemia. Pt reports that she has had intermittent pain in rt groin, heavy lifting exacerbates.  Pt reports that she has been to GYN and there was no explanation for this.  3/9/22: Pt RTC for f/u. Had cscope 2/23/22: - Non-bleeding internal hemorrhoids. - One 3 mm polyp in the sigmoid colon, removed with a cold snare. Resected and retrieved. - Diverticulosis in the sigmoid colon. - Medium-sized lipoma in the ascending colon. - The examination was otherwise normal. Bx c/w adenomatous polyp. Rpt in 7 yrs. Denies any new GI complaints. Has chronic RLQ groin pain.  2/13/23: Pt was referred by Dr. Feliberto Covarrubias for evaluation of dysphagia. Pt reports difficulty swallowing pills and certain foods such as bread. She reports being more stress due to Mother passing away in October 2022. Her omeprazole was increased from 20 mg to 40 mg daily. Last EGD in 2013 revealed hiatal hernia, fundic gland polyps, gastritis. She continues to c/o RLQ pain.  6/1/23: Pt RTC for f/u. Had EGD 4/17/23: - Normal examined duodenum. - Multiple gastric polyps. - 3 cm hiatal hernia. - Z-line regular, 35 cm from the incisors. - Moderate Schatzki ring. Dilated. - Biopsies were taken with a cold forceps for evaluation of eosinophilic esophagitis. - Biopsies were taken with a cold forceps for Helicobacter pylori testing. Bx c/w Covarrubias's esophagus and fundic gland polyp  She reports improvement in swallowing since EGD. Reports occasional chest aching/discomfort with swallowing. Reflux well controlled on omeprazole 40 mg daily. No other GI complaints.  6/18/24: Pt RTC with c/o dysphagia, globus sensation, hoarse voice and throat clearing as well as constipation, bloating and abdominal pain. She remains on omeprazole 20 mg daily. Typically has a BM daily. Stools are hard and she admits to straining. She has incomplete evacuation. Denies hematochezia, fever or chills.  9/9/24: Pt RTC for f/u. Had EGD 8/26/24: -Z-line regular, 35 cm from the incisors. -Moderate Schatzki ring. Biopsied. -Esophageal mucosal changes suggestive of eosinophilic esophagitis. Biopsies were taken with a cold forceps for evaluation of eosinophilic esophagitis. -Multiple gastric polyps. Biopsied. -Non-erosive gastritis, characterized by erythema. Biopsied. -Normal examined duodenum. -3 cm hiatal hernia. Bx c/w reflux type changes, fundic gland polyp  She remains on omeprazole 40 mg daily, but continues to have occasional reflux, cough and globus sensation.

## 2024-11-08 ENCOUNTER — OFFICE VISIT (OUTPATIENT)
Dept: URBAN - NONMETROPOLITAN AREA CLINIC 4 | Facility: CLINIC | Age: 74
End: 2024-11-08
Payer: COMMERCIAL

## 2024-11-08 VITALS
HEART RATE: 99 BPM | TEMPERATURE: 98.2 F | SYSTOLIC BLOOD PRESSURE: 151 MMHG | DIASTOLIC BLOOD PRESSURE: 84 MMHG | BODY MASS INDEX: 31.16 KG/M2 | WEIGHT: 187 LBS | HEIGHT: 65 IN

## 2024-11-08 DIAGNOSIS — D12.5 ADENOMATOUS POLYP OF SIGMOID COLON: ICD-10-CM

## 2024-11-08 DIAGNOSIS — K22.2 SCHATZKI'S RING: ICD-10-CM

## 2024-11-08 DIAGNOSIS — R09.A2 GLOBUS SENSATION: ICD-10-CM

## 2024-11-08 DIAGNOSIS — R49.0 VOICE HOARSENESS: ICD-10-CM

## 2024-11-08 DIAGNOSIS — R05.9 COUGH, UNSPECIFIED TYPE: ICD-10-CM

## 2024-11-08 DIAGNOSIS — K44.9 HIATAL HERNIA: ICD-10-CM

## 2024-11-08 DIAGNOSIS — D13.1 BENIGN FUNDIC GLAND POLYPS OF STOMACH: ICD-10-CM

## 2024-11-08 DIAGNOSIS — K57.30 SIGMOID DIVERTICULOSIS: ICD-10-CM

## 2024-11-08 DIAGNOSIS — K21.9 GASTROESOPHAGEAL REFLUX DISEASE, UNSPECIFIED WHETHER ESOPHAGITIS PRESENT: ICD-10-CM

## 2024-11-08 DIAGNOSIS — K59.00 CONSTIPATION, UNSPECIFIED CONSTIPATION TYPE: ICD-10-CM

## 2024-11-08 PROCEDURE — 99213 OFFICE O/P EST LOW 20 MIN: CPT | Performed by: REGISTERED NURSE

## 2024-11-08 RX ORDER — MULTIVIT-MIN/IRON/FOLIC ACID/K 18-600-40
1 CAPSULE CAPSULE ORAL ONCE A DAY
Status: ACTIVE | COMMUNITY

## 2024-11-08 RX ORDER — PANTOPRAZOLE SODIUM 40 MG/1
1 TABLET TABLET, DELAYED RELEASE ORAL ONCE A DAY
Qty: 30 | Refills: 3 | Status: ACTIVE | COMMUNITY
Start: 2024-09-09

## 2024-11-08 RX ORDER — ASPIRIN 81 MG/1
TABLET, COATED ORAL
Qty: 0 | Refills: 0 | Status: ACTIVE | COMMUNITY

## 2024-11-08 RX ORDER — ATORVASTATIN CALCIUM 80 MG/1
TAKE 1 TABLET (80 MG) BY ORAL ROUTE ONCE DAILY TABLET, FILM COATED ORAL 1
Qty: 0 | Refills: 0 | Status: ACTIVE | COMMUNITY

## 2024-11-08 RX ORDER — OMEPRAZOLE 40 MG/1
1 CAPSULE 30 MINUTES BEFORE MORNING MEAL CAPSULE, DELAYED RELEASE ORAL ONCE A DAY
Status: DISCONTINUED | COMMUNITY

## 2024-11-08 RX ORDER — LISINOPRIL 20 MG/1
1 TABLET TABLET ORAL ONCE A DAY
Refills: 0 | Status: DISCONTINUED | COMMUNITY

## 2024-11-08 NOTE — HPI-TODAY'S VISIT:
Patient with personal history of colon polyps. Occ constipation and hemorrhoids.  Pt with hx of HTN and dyslipidemia. Pt reports that she has had intermittent pain in rt groin, heavy lifting exacerbates.  Pt reports that she has been to GYN and there was no explanation for this.  3/9/22: Pt RTC for f/u. Had cscope 2/23/22: - Non-bleeding internal hemorrhoids. - One 3 mm polyp in the sigmoid colon, removed with a cold snare. Resected and retrieved. - Diverticulosis in the sigmoid colon. - Medium-sized lipoma in the ascending colon. - The examination was otherwise normal. Bx c/w adenomatous polyp. Rpt in 7 yrs. Denies any new GI complaints. Has chronic RLQ groin pain.  2/13/23: Pt was referred by Dr. Feliberto Covarrubias for evaluation of dysphagia. Pt reports difficulty swallowing pills and certain foods such as bread. She reports being more stress due to Mother passing away in October 2022. Her omeprazole was increased from 20 mg to 40 mg daily. Last EGD in 2013 revealed hiatal hernia, fundic gland polyps, gastritis. She continues to c/o RLQ pain.  6/1/23: Pt RTC for f/u. Had EGD 4/17/23: - Normal examined duodenum. - Multiple gastric polyps. - 3 cm hiatal hernia. - Z-line regular, 35 cm from the incisors. - Moderate Schatzki ring. Dilated. - Biopsies were taken with a cold forceps for evaluation of eosinophilic esophagitis. - Biopsies were taken with a cold forceps for Helicobacter pylori testing. Bx c/w Covarrubias's esophagus and fundic gland polyp  She reports improvement in swallowing since EGD. Reports occasional chest aching/discomfort with swallowing. Reflux well controlled on omeprazole 40 mg daily. No other GI complaints.  6/18/24: Pt RTC with c/o dysphagia, globus sensation, hoarse voice and throat clearing as well as constipation, bloating and abdominal pain. She remains on omeprazole 20 mg daily. Typically has a BM daily. Stools are hard and she admits to straining. She has incomplete evacuation. Denies hematochezia, fever or chills.  9/9/24: Pt RTC for f/u. Had EGD 8/26/24: -Z-line regular, 35 cm from the incisors. -Moderate Schatzki ring. Biopsied. -Esophageal mucosal changes suggestive of eosinophilic esophagitis. Biopsies were taken with a cold forceps for evaluation of eosinophilic esophagitis. -Multiple gastric polyps. Biopsied. -Non-erosive gastritis, characterized by erythema. Biopsied. -Normal examined duodenum. -3 cm hiatal hernia. Bx c/w reflux type changes, fundic gland polyp  She remains on omeprazole 40 mg daily, but continues to have occasional reflux, cough and globus sensation.  11/8/24: Pt RTC for f/u. She does not notice any major difference on Protonix. Her BP medication was switched and she no longer has cough. Reflux mostly well controlled. Has occasional heartburn. No other GI complaints.

## 2025-01-13 ENCOUNTER — TELEPHONE ENCOUNTER (OUTPATIENT)
Dept: URBAN - NONMETROPOLITAN AREA CLINIC 4 | Facility: CLINIC | Age: 75
End: 2025-01-13

## 2025-01-13 RX ORDER — PANTOPRAZOLE SODIUM 40 MG/1
1 TABLET TABLET, DELAYED RELEASE ORAL ONCE A DAY
Qty: 30 | Refills: 3
Start: 2024-09-09

## 2025-05-05 ENCOUNTER — OFFICE VISIT (OUTPATIENT)
Dept: URBAN - NONMETROPOLITAN AREA CLINIC 4 | Facility: CLINIC | Age: 75
End: 2025-05-05
Payer: COMMERCIAL

## 2025-05-05 DIAGNOSIS — K44.9 HIATAL HERNIA: ICD-10-CM

## 2025-05-05 DIAGNOSIS — D13.1 BENIGN FUNDIC GLAND POLYPS OF STOMACH: ICD-10-CM

## 2025-05-05 DIAGNOSIS — K59.00 CONSTIPATION, UNSPECIFIED CONSTIPATION TYPE: ICD-10-CM

## 2025-05-05 DIAGNOSIS — R09.89 THROAT CLEARING: ICD-10-CM

## 2025-05-05 DIAGNOSIS — K57.30 SIGMOID DIVERTICULOSIS: ICD-10-CM

## 2025-05-05 DIAGNOSIS — D12.5 ADENOMATOUS POLYP OF SIGMOID COLON: ICD-10-CM

## 2025-05-05 DIAGNOSIS — K21.9 GASTROESOPHAGEAL REFLUX DISEASE, UNSPECIFIED WHETHER ESOPHAGITIS PRESENT: ICD-10-CM

## 2025-05-05 DIAGNOSIS — K22.2 SCHATZKI'S RING: ICD-10-CM

## 2025-05-05 PROCEDURE — 99213 OFFICE O/P EST LOW 20 MIN: CPT | Performed by: REGISTERED NURSE

## 2025-05-05 RX ORDER — ATORVASTATIN CALCIUM 80 MG/1
TAKE 1 TABLET (80 MG) BY ORAL ROUTE ONCE DAILY TABLET, FILM COATED ORAL 1
Qty: 0 | Refills: 0 | Status: ACTIVE | COMMUNITY

## 2025-05-05 RX ORDER — MULTIVIT-MIN/IRON/FOLIC ACID/K 18-600-40
1 CAPSULE CAPSULE ORAL ONCE A DAY
Status: ACTIVE | COMMUNITY

## 2025-05-05 RX ORDER — PANTOPRAZOLE SODIUM 40 MG/1
1 TABLET TABLET, DELAYED RELEASE ORAL ONCE A DAY
Qty: 30 | Refills: 3 | Status: ACTIVE | COMMUNITY
Start: 2024-09-09

## 2025-05-05 RX ORDER — VONOPRAZAN FUMARATE 13.36 MG/1
1 TABLET TABLET ORAL ONCE A DAY
Qty: 30 | Refills: 3 | OUTPATIENT
Start: 2025-05-05 | End: 2025-09-02

## 2025-05-05 RX ORDER — ASPIRIN 81 MG/1
TABLET, COATED ORAL
Qty: 0 | Refills: 0 | Status: ACTIVE | COMMUNITY

## 2025-05-05 NOTE — HPI-TODAY'S VISIT:
Patient with personal history of colon polyps. Occ constipation and hemorrhoids.  Pt with hx of HTN and dyslipidemia. Pt reports that she has had intermittent pain in rt groin, heavy lifting exacerbates.  Pt reports that she has been to GYN and there was no explanation for this.  3/9/22: Pt RTC for f/u. Had cscope 2/23/22: - Non-bleeding internal hemorrhoids. - One 3 mm polyp in the sigmoid colon, removed with a cold snare. Resected and retrieved. - Diverticulosis in the sigmoid colon. - Medium-sized lipoma in the ascending colon. - The examination was otherwise normal. Bx c/w adenomatous polyp. Rpt in 7 yrs. Denies any new GI complaints. Has chronic RLQ groin pain.  2/13/23: Pt was referred by Dr. Feliberto Covarrubias for evaluation of dysphagia. Pt reports difficulty swallowing pills and certain foods such as bread. She reports being more stress due to Mother passing away in October 2022. Her omeprazole was increased from 20 mg to 40 mg daily. Last EGD in 2013 revealed hiatal hernia, fundic gland polyps, gastritis. She continues to c/o RLQ pain.  6/1/23: Pt RTC for f/u. Had EGD 4/17/23: - Normal examined duodenum. - Multiple gastric polyps. - 3 cm hiatal hernia. - Z-line regular, 35 cm from the incisors. - Moderate Schatzki ring. Dilated. - Biopsies were taken with a cold forceps for evaluation of eosinophilic esophagitis. - Biopsies were taken with a cold forceps for Helicobacter pylori testing. Bx c/w Covarrubias's esophagus and fundic gland polyp  She reports improvement in swallowing since EGD. Reports occasional chest aching/discomfort with swallowing. Reflux well controlled on omeprazole 40 mg daily. No other GI complaints.  6/18/24: Pt RTC with c/o dysphagia, globus sensation, hoarse voice and throat clearing as well as constipation, bloating and abdominal pain. She remains on omeprazole 20 mg daily. Typically has a BM daily. Stools are hard and she admits to straining. She has incomplete evacuation. Denies hematochezia, fever or chills.  9/9/24: Pt RTC for f/u. Had EGD 8/26/24: -Z-line regular, 35 cm from the incisors. -Moderate Schatzki ring. Biopsied. -Esophageal mucosal changes suggestive of eosinophilic esophagitis. Biopsies were taken with a cold forceps for evaluation of eosinophilic esophagitis. -Multiple gastric polyps. Biopsied. -Non-erosive gastritis, characterized by erythema. Biopsied. -Normal examined duodenum. -3 cm hiatal hernia. Bx c/w reflux type changes, fundic gland polyp  She remains on omeprazole 40 mg daily, but continues to have occasional reflux, cough and globus sensation.  11/8/24: Pt RTC for f/u. She does not notice any major difference on Protonix. Her BP medication was switched and she no longer has cough. Reflux mostly well controlled. Has occasional heartburn. No other GI complaints.  5/5/25: Pt RTC for f/u. She mostly c/o throat clearing. 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 She remains on Protonix daily.

## 2025-06-05 ENCOUNTER — OFFICE VISIT (OUTPATIENT)
Dept: URBAN - NONMETROPOLITAN AREA CLINIC 4 | Facility: CLINIC | Age: 75
End: 2025-06-05

## 2025-06-23 ENCOUNTER — OFFICE VISIT (OUTPATIENT)
Dept: URBAN - NONMETROPOLITAN AREA CLINIC 4 | Facility: CLINIC | Age: 75
End: 2025-06-23

## 2025-07-22 ENCOUNTER — TELEPHONE ENCOUNTER (OUTPATIENT)
Dept: URBAN - NONMETROPOLITAN AREA CLINIC 4 | Facility: CLINIC | Age: 75
End: 2025-07-22

## 2025-07-22 RX ORDER — PANTOPRAZOLE SODIUM 40 MG/1
1 TABLET TABLET, DELAYED RELEASE ORAL ONCE A DAY
Qty: 30 | Refills: 3
Start: 2024-09-09

## 2025-07-31 ENCOUNTER — OFFICE VISIT (OUTPATIENT)
Dept: URBAN - NONMETROPOLITAN AREA CLINIC 4 | Facility: CLINIC | Age: 75
End: 2025-07-31

## 2025-07-31 RX ORDER — ASPIRIN 81 MG/1
TABLET, COATED ORAL
Qty: 0 | Refills: 0 | Status: ACTIVE | COMMUNITY

## 2025-07-31 RX ORDER — MULTIVIT-MIN/IRON/FOLIC ACID/K 18-600-40
1 CAPSULE CAPSULE ORAL ONCE A DAY
Status: ACTIVE | COMMUNITY

## 2025-07-31 RX ORDER — PANTOPRAZOLE SODIUM 40 MG/1
1 TABLET TABLET, DELAYED RELEASE ORAL ONCE A DAY
Qty: 30 | Refills: 3
Start: 2025-07-31

## 2025-07-31 RX ORDER — ATORVASTATIN CALCIUM 80 MG/1
TAKE 1 TABLET (80 MG) BY ORAL ROUTE ONCE DAILY TABLET, FILM COATED ORAL 1
Qty: 0 | Refills: 0 | Status: ACTIVE | COMMUNITY

## 2025-07-31 RX ORDER — PANTOPRAZOLE SODIUM 40 MG/1
1 TABLET TABLET, DELAYED RELEASE ORAL ONCE A DAY
Qty: 30 | Refills: 3 | Status: ACTIVE | COMMUNITY
Start: 2024-09-09

## 2025-07-31 RX ORDER — VONOPRAZAN FUMARATE 13.36 MG/1
1 TABLET TABLET ORAL ONCE A DAY
Qty: 30 | Refills: 3 | Status: ON HOLD | COMMUNITY
Start: 2025-05-05 | End: 2025-09-02

## 2025-07-31 NOTE — HPI-TODAY'S VISIT:
Patient with personal history of colon polyps. Occ constipation and hemorrhoids.  Pt with hx of HTN and dyslipidemia. Pt reports that she has had intermittent pain in rt groin, heavy lifting exacerbates.  Pt reports that she has been to GYN and there was no explanation for this.  3/9/22: Pt RTC for f/u. Had cscope 2/23/22: - Non-bleeding internal hemorrhoids. - One 3 mm polyp in the sigmoid colon, removed with a cold snare. Resected and retrieved. - Diverticulosis in the sigmoid colon. - Medium-sized lipoma in the ascending colon. - The examination was otherwise normal. Bx c/w adenomatous polyp. Rpt in 7 yrs. Denies any new GI complaints. Has chronic RLQ groin pain.  2/13/23: Pt was referred by Dr. Feliberto Covarrubias for evaluation of dysphagia. Pt reports difficulty swallowing pills and certain foods such as bread. She reports being more stress due to Mother passing away in October 2022. Her omeprazole was increased from 20 mg to 40 mg daily. Last EGD in 2013 revealed hiatal hernia, fundic gland polyps, gastritis. She continues to c/o RLQ pain.  6/1/23: Pt RTC for f/u. Had EGD 4/17/23: - Normal examined duodenum. - Multiple gastric polyps. - 3 cm hiatal hernia. - Z-line regular, 35 cm from the incisors. - Moderate Schatzki ring. Dilated. - Biopsies were taken with a cold forceps for evaluation of eosinophilic esophagitis. - Biopsies were taken with a cold forceps for Helicobacter pylori testing. Bx c/w Covarrubias's esophagus and fundic gland polyp  She reports improvement in swallowing since EGD. Reports occasional chest aching/discomfort with swallowing. Reflux well controlled on omeprazole 40 mg daily. No other GI complaints.  6/18/24: Pt RTC with c/o dysphagia, globus sensation, hoarse voice and throat clearing as well as constipation, bloating and abdominal pain. She remains on omeprazole 20 mg daily. Typically has a BM daily. Stools are hard and she admits to straining. She has incomplete evacuation. Denies hematochezia, fever or chills.  9/9/24: Pt RTC for f/u. Had EGD 8/26/24: -Z-line regular, 35 cm from the incisors. -Moderate Schatzki ring. Biopsied. -Esophageal mucosal changes suggestive of eosinophilic esophagitis. Biopsies were taken with a cold forceps for evaluation of eosinophilic esophagitis. -Multiple gastric polyps. Biopsied. -Non-erosive gastritis, characterized by erythema. Biopsied. -Normal examined duodenum. -3 cm hiatal hernia. Bx c/w reflux type changes, fundic gland polyp  She remains on omeprazole 40 mg daily, but continues to have occasional reflux, cough and globus sensation.  11/8/24: Pt RTC for f/u. She does not notice any major difference on Protonix. Her BP medication was switched and she no longer has cough. Reflux mostly well controlled. Has occasional heartburn. No other GI complaints.  5/5/25: Pt RTC for f/u. She mostly c/o throat clearing. 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 She remains on Protonix daily.  7.31.25 we reviewed her EGD today, as she states that it was not gone over with her before  She is continuing to have globus despite change to PCAM back to PPI with continued syptoms  EoE changes noted on multiple endoscopies